# Patient Record
(demographics unavailable — no encounter records)

---

## 2025-02-10 NOTE — PHYSICAL EXAM
[Abdominal Masses] : No abdominal masses [Abdomen Tenderness] : ~T ~M No abdominal tenderness [Alert] : alert [Oriented to Person] : oriented to person [Oriented to Place] : oriented to place [Oriented to Time] : oriented to time [Calm] : calm [de-identified] : She  is alert, well-groomed, and in NAD   [de-identified] : anicteric.  Nasal mucosa pink, septum midline. Oral mucosa pink.  Tongue midline, Pharynx without exudates.   [de-identified] : Neck supple. Trachea midline. Thyroid isthmus barely palpable, lobes not felt.   [de-identified] :  incarcerated incisional hernia, non-tender.  The defect appears to be relatively small, less than 3 cm and the skin overlying the hernia is normal

## 2025-02-10 NOTE — PHYSICAL EXAM
[Abdominal Masses] : No abdominal masses [Abdomen Tenderness] : ~T ~M No abdominal tenderness [Alert] : alert [Oriented to Person] : oriented to person [Oriented to Place] : oriented to place [Oriented to Time] : oriented to time [Calm] : calm [de-identified] : She  is alert, well-groomed, and in NAD   [de-identified] : anicteric.  Nasal mucosa pink, septum midline. Oral mucosa pink.  Tongue midline, Pharynx without exudates.   [de-identified] : Neck supple. Trachea midline. Thyroid isthmus barely palpable, lobes not felt.   [de-identified] :  incarcerated incisional hernia, non-tender.  The defect appears to be relatively small, less than 3 cm and the skin overlying the hernia is normal

## 2025-02-10 NOTE — HISTORY OF PRESENT ILLNESS
[de-identified] : Ms. KARMA KATZ is a 61 year old female who was referred by Dr. Jenny Bearden in May 2024  with the chief complaint of having a lump in her right abdomen for 1 month. On exam she did not have a palpable hernia/mass . Ms. KATZ  is s/p CT scan of abdomen and pelvis on 06/12/2024 that showed Small fat-containing right sided abdominal wall hernia just right of midline   measuring 1.6 cm. she wanted to have the hernia repaired. she is returning today with a cc of having pain  in the left abdomen . she states that she is scheduled to have spine surgery  in 6 weeks.  she smokes marijuana for the headaches

## 2025-02-10 NOTE — HISTORY OF PRESENT ILLNESS
[de-identified] : Ms. KARMA KATZ is a 61 year old female who was referred by Dr. Jenny Bearden in May 2024  with the chief complaint of having a lump in her right abdomen for 1 month. On exam she did not have a palpable hernia/mass . Ms. KATZ  is s/p CT scan of abdomen and pelvis on 06/12/2024 that showed Small fat-containing right sided abdominal wall hernia just right of midline   measuring 1.6 cm. she wanted to have the hernia repaired. she is returning today with a cc of having pain  in the left abdomen . she states that she is scheduled to have spine surgery  in 6 weeks.  she smokes marijuana for the headaches

## 2025-02-10 NOTE — PLAN
[FreeTextEntry1] : Ms. KATZ  was told significance of findings, options, risks and benefits were explained.  Informed consent for laparoscopic/possible open  incarcerated   incisional hernia repair       , and potential risks, benefits and alternatives (surgical options were discussed including non-surgical options or the option of no surgery) to the planned surgery were discussed in depth.  All surgical options were discussed including non-surgical treatments.  She wishes to proceed with surgery.  We will plan for surgery on at the next available date, pending any required insurance pre-certification or pre-approval. She agrees to obtain any necessary pre-operative evaluations and testing prior to surgery. Patient advised to seek immediate medical attention with any acute change in symptoms or with the development of any new or worsening symptoms.  Patient's questions and concerns addressed to patient's satisfaction, and patient verbalized an understanding of the information discussed.

## 2025-04-10 NOTE — HISTORY OF PRESENT ILLNESS
[de-identified] : Ms. KATZ  is s/p Laparoscopic repair of incarcerated incisional hernia on 03/14/2025.  Today  Ms. KATZ offers no complaints. patient reports no fever or  chills. patient reports occasional discomfort in the surgical area.  Her surgical incisions are healing well. No signs of inflammation, infection or exudate. patient reports good bowel movements and appetite.

## 2025-04-10 NOTE — PHYSICAL EXAM
[Abdominal Masses] : No abdominal masses [Abdomen Tenderness] : ~T ~M No abdominal tenderness [Alert] : alert [Oriented to Person] : oriented to person [Oriented to Place] : oriented to place [Oriented to Time] : oriented to time [Calm] : calm [de-identified] : She  is alert, well-groomed, and in NAD   [de-identified] : anicteric.  Nasal mucosa pink, septum midline. Oral mucosa pink.  Tongue midline, Pharynx without exudates.   [de-identified] : Neck supple. Trachea midline. Thyroid isthmus barely palpable, lobes not felt.   [de-identified] :  Surgical wounds are  healing well.   no signs of  inflammation or infection. l

## 2025-04-10 NOTE — ASSESSMENT
[FreeTextEntry1] : Ms. KATZ is doing well, with excellent post-operative recovery. All surgical incisions are healing well and as expected. There is no evidence of infection or complication, and she is progressing as expected. Post-operative wound care, activity, restrictions and precautions reinforced. Patient instructed to refrain from any heavy lifting greater than 10-15 pounds for at least 4-6 weeks post-operatively. Patient's questions and concerns addressed to patient's satisfaction.

## 2025-04-10 NOTE — PHYSICAL EXAM
[Abdominal Masses] : No abdominal masses [Abdomen Tenderness] : ~T ~M No abdominal tenderness [Alert] : alert [Oriented to Person] : oriented to person [Oriented to Place] : oriented to place [Oriented to Time] : oriented to time [Calm] : calm [de-identified] : She  is alert, well-groomed, and in NAD   [de-identified] : anicteric.  Nasal mucosa pink, septum midline. Oral mucosa pink.  Tongue midline, Pharynx without exudates.   [de-identified] : Neck supple. Trachea midline. Thyroid isthmus barely palpable, lobes not felt.   [de-identified] :  Surgical wounds are  healing well.   no signs of  inflammation or infection. l

## 2025-04-10 NOTE — HISTORY OF PRESENT ILLNESS
[de-identified] : Ms. KATZ  is s/p Laparoscopic repair of incarcerated incisional hernia on 03/14/2025.  Today  Ms. KATZ offers no complaints. patient reports no fever or  chills. patient reports occasional discomfort in the surgical area.  Her surgical incisions are healing well. No signs of inflammation, infection or exudate. patient reports good bowel movements and appetite.

## 2025-07-01 NOTE — HISTORY OF PRESENT ILLNESS
[de-identified] : Ms. KATZ  is s/p Laparoscopic repair of incarcerated incisional hernia on 03/14/2025.

## 2025-07-21 NOTE — PHYSICAL EXAM
[Abdominal Masses] : No abdominal masses [Abdomen Tenderness] : ~T ~M No abdominal tenderness [Alert] : alert [Oriented to Person] : oriented to person [Oriented to Place] : oriented to place [Oriented to Time] : oriented to time [Calm] : calm [de-identified] : She  is alert, well-groomed, and in NAD   [de-identified] : anicteric.  Nasal mucosa pink, septum midline. Oral mucosa pink.  Tongue midline, Pharynx without exudates.   [de-identified] : Neck supple. Trachea midline. Thyroid isthmus barely palpable, lobes not felt.   [de-identified] :  Surgical wounds are  healing well.   no signs of  inflammation or infection. l [de-identified] : posterior neck infected cyst  is mobile, Firm,  Smooth, non-tender,   Well defined. Superficial . No palpable lymph nodes.   Mass size - 3  cm x  3 cm.

## 2025-07-21 NOTE — HISTORY OF PRESENT ILLNESS
[de-identified] : Ms. KATZ  is s/p Laparoscopic repair of incarcerated incisional hernia on 03/14/2025.  Today  Ms. KATZ offers no complaints. patient reports no fever or  chills. patient reports occasional discomfort  around the surgical area.  Her surgical incisions are healing well. No signs of inflammation, infection or exudate. patient reports good bowel movements and appetite.   She is also with the chief complaint of having posterior neck  mass.  She reports having  a small pimple for  many years. She denies any trauma to the area.   She denies any fever or  night sweats.  She states that 1 month ago the mass started to  get  bigger and  more symptomatic. she was prescribed Bactrim.

## 2025-07-21 NOTE — HISTORY OF PRESENT ILLNESS
[de-identified] : Ms. KATZ  is s/p Laparoscopic repair of incarcerated incisional hernia on 03/14/2025.  Today  Ms. KATZ offers no complaints. patient reports no fever or  chills. patient reports occasional discomfort  around the surgical area.  Her surgical incisions are healing well. No signs of inflammation, infection or exudate. patient reports good bowel movements and appetite.   She is also with the chief complaint of having posterior neck  mass.  She reports having  a small pimple for  many years. She denies any trauma to the area.   She denies any fever or  night sweats.  She states that 1 month ago the mass started to  get  bigger and  more symptomatic. she was prescribed Bactrim.

## 2025-07-21 NOTE — ASSESSMENT
[FreeTextEntry1] : Impression:   1.  incarcerated   incisional hernia  Ms. KATZ is doing well, with excellent post-operative recovery. All surgical incisions  healed well and as expected. There is no evidence of  complication or recurrence, and she is progressing as expected. Patient can return to normal activity without restrictions.  Patient's questions and concerns addressed to patient's satisfaction.  2. posterior neck infected cyst - patient is refusing incision and drainage

## 2025-07-21 NOTE — PHYSICAL EXAM
[Abdominal Masses] : No abdominal masses [Abdomen Tenderness] : ~T ~M No abdominal tenderness [Alert] : alert [Oriented to Person] : oriented to person [Oriented to Place] : oriented to place [Oriented to Time] : oriented to time [Calm] : calm [de-identified] : She  is alert, well-groomed, and in NAD   [de-identified] : anicteric.  Nasal mucosa pink, septum midline. Oral mucosa pink.  Tongue midline, Pharynx without exudates.   [de-identified] : Neck supple. Trachea midline. Thyroid isthmus barely palpable, lobes not felt.   [de-identified] :  Surgical wounds are  healing well.   no signs of  inflammation or infection. l [de-identified] : posterior neck infected cyst  is mobile, Firm,  Smooth, non-tender,   Well defined. Superficial . No palpable lymph nodes.   Mass size - 3  cm x  3 cm.

## 2025-07-21 NOTE — PLAN
[FreeTextEntry1] : Ms. KATZ  has infected posterior neck skin cyst. She was told significance of findings, options, risks and benefits were explained.  she was offered to have it drained. she is refusing and wants to have it done under sedation.  Patient advised to  go to ED.  Patient's questions and concerns addressed to patient's satisfaction, and patient verbalized an understanding of the information discussed.